# Patient Record
Sex: MALE | Race: BLACK OR AFRICAN AMERICAN | NOT HISPANIC OR LATINO | Employment: FULL TIME | URBAN - METROPOLITAN AREA
[De-identification: names, ages, dates, MRNs, and addresses within clinical notes are randomized per-mention and may not be internally consistent; named-entity substitution may affect disease eponyms.]

---

## 2022-11-14 ENCOUNTER — TELEPHONE (OUTPATIENT)
Dept: ENDOCRINOLOGY | Facility: CLINIC | Age: 62
End: 2022-11-14
Payer: MEDICAID

## 2022-11-14 NOTE — TELEPHONE ENCOUNTER
Spoke with wife, caregiver, and soonest appt given for 4/17 with Carrie, DNP  Also placed on waiting list and resource number provided for other endocrinologists in the area

## 2022-11-14 NOTE — TELEPHONE ENCOUNTER
----- Message from Linda Hardy, Patient Care Assistant sent at 11/11/2022 12:27 PM CST -----  Regarding: appointment  Contact: cherelle pt's wife  Type:  Sooner Appointment Request    Caller is requesting a sooner appointment.  Caller declined first available appointment listed below.  Caller will not accept being placed on the waitlist and is requesting a message be sent to doctor.    Name of Caller:  cherelle pt's wife  When is the first available appointment?  2022  Symptoms:  new pt est care - diabetic   Best Call Back Number:    Additional Information:  please call to advise. Thanks!